# Patient Record
Sex: MALE | Race: WHITE | NOT HISPANIC OR LATINO | Employment: FULL TIME | URBAN - METROPOLITAN AREA
[De-identification: names, ages, dates, MRNs, and addresses within clinical notes are randomized per-mention and may not be internally consistent; named-entity substitution may affect disease eponyms.]

---

## 2020-07-04 ENCOUNTER — HOSPITAL ENCOUNTER (EMERGENCY)
Facility: HOSPITAL | Age: 30
Discharge: HOME | End: 2020-07-04
Attending: EMERGENCY MEDICINE
Payer: OTHER GOVERNMENT

## 2020-07-04 VITALS
WEIGHT: 240 LBS | TEMPERATURE: 99 F | HEART RATE: 106 BPM | DIASTOLIC BLOOD PRESSURE: 82 MMHG | RESPIRATION RATE: 18 BRPM | BODY MASS INDEX: 33.6 KG/M2 | SYSTOLIC BLOOD PRESSURE: 135 MMHG | OXYGEN SATURATION: 98 % | HEIGHT: 71 IN

## 2020-07-04 DIAGNOSIS — J02.9 SORE THROAT: Primary | ICD-10-CM

## 2020-07-04 PROCEDURE — 99282 EMERGENCY DEPT VISIT SF MDM: CPT

## 2020-07-04 PROCEDURE — U0003 INFECTIOUS AGENT DETECTION BY NUCLEIC ACID (DNA OR RNA); SEVERE ACUTE RESPIRATORY SYNDROME CORONAVIRUS 2 (SARS-COV-2) (CORONAVIRUS DISEASE [COVID-19]), AMPLIFIED PROBE TECHNIQUE, MAKING USE OF HIGH THROUGHPUT TECHNOLOGIES AS DESCRIBED BY CMS-2020-01-R: HCPCS

## 2020-07-04 NOTE — ED PROVIDER NOTES
Encounter Date: 7/4/2020    SCRIBE #1 NOTE: I, Claude Washington, am scribing for, and in the presence of, Dr. Martínez.       History     Chief Complaint   Patient presents with    Sore Throat     Time seen by provider: 3:40 PM on 07/04/2020      Raoul Burgos is a 30 y.o. male with no pertinent PMHx who presents to the ED for a sore throat that started 1 day ago. 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 He states that his sore throat is his only symptom. Patient denies fever, body aches, chest pain, SOB, chills, abdominal pain, nausea, vomiting, diarrhea, or any other complaint at this time. The patient has no pertinent PSHx.    The history is provided by the patient.     Review of patient's allergies indicates:  No Known Allergies  History reviewed. No pertinent past medical history.  No past surgical history on file.  No family history on file.  Social History     Tobacco Use    Smoking status: Not on file   Substance Use Topics    Alcohol use: Not on file    Drug use: Not on file     Review of Systems   Constitutional: Negative for chills and fever.   HENT: Positive for sore throat. Negative for congestion.    Eyes: Negative for pain.   Respiratory: Negative for cough and shortness of breath.    Cardiovascular: Negative for chest pain.   Gastrointestinal: Negative for abdominal pain, diarrhea, nausea and vomiting.   Genitourinary: Negative for dysuria.   Musculoskeletal: Negative for myalgias.   Skin: Negative for color change.   Neurological: Negative for headaches.   Hematological: Does not bruise/bleed easily.       Physical Exam     Initial Vitals [07/04/20 1445]   BP Pulse Resp Temp SpO2   135/82 106 18 99 °F (37.2 °C) 98 %      MAP       --         Physical Exam    Nursing note and vitals reviewed.  Constitutional: He appears well-developed and well-nourished. He is not diaphoretic. No distress.   HENT:   Head: Normocephalic and atraumatic.   Eyes:  EOM are normal. Pupils are equal, round, and reactive to light.   Neck: Normal range of motion. Neck supple.   Cardiovascular: Normal rate, regular rhythm, normal heart sounds and intact distal pulses. Exam reveals no gallop and no friction rub.    No murmur heard.  Pulmonary/Chest: Breath sounds normal. No respiratory distress. He has no wheezes. He has no rhonchi. He has no rales.   Abdominal: Soft. Bowel sounds are normal. There is no abdominal tenderness. There is no rebound and no guarding.   Musculoskeletal: Normal range of motion.   Neurological: He is alert and oriented to person, place, and time.   Skin: Skin is warm.   Psychiatric: He has a normal mood and affect. His behavior is normal. Judgment and thought content normal.         ED Course   Procedures  Labs Reviewed   SARS-COV-2 (COVID-19) QUALITATIVE PCR          Imaging Results    None          Medical Decision Making:   History:   Old Medical Records: I decided to obtain old medical records.  Initial Assessment:   30-year-old male presented with a sore throat.  Differential Diagnosis:   Initial differential diagnosis included but not limited to pharyngitis, viral illness, and COVID.  Clinical Tests:   Lab Tests: Ordered and Reviewed  ED Management:  The patient was urgently evaluated in the emergency department, his evaluation was significant for a young male with a benign exam.  The patient states he is here for COVID testing.  We did send a routine COVID test from the emergency department.  He is stable for discharge to home.  There is no evidence of a bacterial pharyngitis noted at present.  He has been instructed to self isolated home until he knows his test results.  He is to otherwise  follow-up with primary care.  He is to take over-the-counter medications as needed for symptomatic relief.              Scribe Attestation:   Scribe #1: I performed the above scribed service and the documentation accurately describes the services I performed. I  attest to the accuracy of the note.               I, Dr. Vishnu Martínez, personally performed the services described in this documentation. All medical record entries made by the scribe were at my direction and in my presence.  I have reviewed the chart and agree that the record reflects my personal performance and is accurate and complete. Vishnu Martínez MD.  5:21 PM 07/04/2020             Clinical Impression:       ICD-10-CM ICD-9-CM   1. Sore throat  J02.9 462         Disposition:   Disposition: Discharged  Condition: Stable     ED Disposition Condition    Discharge Stable        ED Prescriptions     None        Follow-up Information     Follow up With Specialties Details Why Contact [address] [address]  [phone]                                       Vishnu Martínez MD  07/04/20 8084

## 2020-07-05 LAB — SARS-COV-2 RNA RESP QL NAA+PROBE: NOT DETECTED

## 2021-05-12 ENCOUNTER — PATIENT MESSAGE (OUTPATIENT)
Dept: RESEARCH | Facility: HOSPITAL | Age: 31
End: 2021-05-12

## 2021-07-01 ENCOUNTER — PATIENT MESSAGE (OUTPATIENT)
Dept: ADMINISTRATIVE | Facility: OTHER | Age: 31
End: 2021-07-01

## 2024-02-26 ENCOUNTER — OFFICE VISIT (OUTPATIENT)
Dept: FAMILY MEDICINE | Facility: CLINIC | Age: 34
End: 2024-02-26

## 2024-02-26 VITALS
HEART RATE: 100 BPM | RESPIRATION RATE: 18 BRPM | SYSTOLIC BLOOD PRESSURE: 130 MMHG | HEIGHT: 71 IN | TEMPERATURE: 99 F | OXYGEN SATURATION: 96 % | BODY MASS INDEX: 35.28 KG/M2 | WEIGHT: 252 LBS | DIASTOLIC BLOOD PRESSURE: 80 MMHG

## 2024-02-26 DIAGNOSIS — Z00.00 PREVENTATIVE HEALTH CARE: ICD-10-CM

## 2024-02-26 DIAGNOSIS — F41.9 ANXIETY: Primary | ICD-10-CM

## 2024-02-26 DIAGNOSIS — R05.3 CHRONIC COUGH: ICD-10-CM

## 2024-02-26 DIAGNOSIS — F17.200 SMOKING: ICD-10-CM

## 2024-02-26 PROCEDURE — 99213 OFFICE O/P EST LOW 20 MIN: CPT | Mod: PBBFAC,PN | Performed by: FAMILY MEDICINE

## 2024-02-26 PROCEDURE — 99999 PR PBB SHADOW E&M-EST. PATIENT-LVL III: CPT | Mod: PBBFAC,,, | Performed by: FAMILY MEDICINE

## 2024-02-26 PROCEDURE — 99204 OFFICE O/P NEW MOD 45 MIN: CPT | Mod: S$PBB,,, | Performed by: FAMILY MEDICINE

## 2024-02-26 NOTE — PROGRESS NOTES
"Subjective:       Patient ID: Raoul Burgos is a 34 y.o. male.    Chief Complaint: Establish Care, Anxiety, Depression, and ocd      Is here to get established.  He does have a history of depression anxiety possible childhood ADD and OCD type behaviors.  There is no problem list on file for this patient.   Smokes 1ppd.                        Anxiety  Presents for initial visit. Onset was more than 5 years ago. The problem has been resolved. Symptoms include excessive worry, nervous/anxious behavior and obsessions. Patient reports no chest pain, compulsions, confusion, decreased concentration or feeling of choking.       DepressionPatient presents with the following symptoms: excessive worry, nervousness/anxiety and obsessions.  Patient is not experiencing: compulsions, confusion and decreased concentration.          Allergies and Medications:   Review of patient's allergies indicates:  No Known Allergies  No current outpatient medications on file.     No current facility-administered medications for this visit.       Family History:   Family History   Problem Relation Age of Onset    Cancer Father         possible lung    Cancer Other         lung       Social History:   Social History     Socioeconomic History    Marital status: Single   Tobacco Use    Smoking status: Every Day     Types: Cigarettes    Smokeless tobacco: Never   Substance and Sexual Activity    Alcohol use: Yes     Comment: occ    Drug use: Not Currently     Types: "Crack" cocaine, Methamphetamines, MDMA (Ecstacy)    Sexual activity: Not Currently     Partners: Female       Review of Systems   Cardiovascular:  Negative for chest pain.   Psychiatric/Behavioral:  Positive for depression. Negative for confusion and decreased concentration. The patient is nervous/anxious.        Objective:     Vitals:    02/26/24 1401   BP: 130/80   Pulse: 100   Resp: 18   Temp: 98.6 °F (37 °C)        Physical Exam  Vitals and nursing note reviewed. "   Constitutional:       Appearance: He is well-developed. He is not diaphoretic.   HENT:      Head: Normocephalic.   Eyes:      Conjunctiva/sclera: Conjunctivae normal.      Pupils: Pupils are equal, round, and reactive to light.   Cardiovascular:      Rate and Rhythm: Normal rate and regular rhythm.      Heart sounds: Normal heart sounds. No murmur heard.     No friction rub. No gallop.   Pulmonary:      Effort: Pulmonary effort is normal. No respiratory distress.      Breath sounds: Normal breath sounds. No stridor. No wheezing, rhonchi or rales.   Chest:      Chest wall: No tenderness.   Musculoskeletal:      Right lower leg: No edema.      Left lower leg: No edema.   Skin:     General: Skin is warm and dry.   Psychiatric:         Behavior: Behavior normal.         Thought Content: Thought content normal.         Judgment: Judgment normal.         Assessment:       1. Anxiety    2. Preventative health care    3. Chronic cough    4. Smoking        Plan:       Raoul was seen today for establish care, anxiety, depression and ocd.    Diagnoses and all orders for this visit:    Anxiety  -     Ambulatory referral/consult to Psychology; Future    Preventative health care  -     HEPATITIS C ANTIBODY; Future  -     LIPID PANEL; Future  -     HIV 1/2 Ag/Ab (4th Gen); Future  -     Comprehensive Metabolic Panel; Future    Chronic cough  -     X-Ray Chest PA And Lateral; Future    Smoking         Follow up in about 6 months (around 8/26/2024) for annual.

## 2024-03-27 ENCOUNTER — HOSPITAL ENCOUNTER (OUTPATIENT)
Dept: RADIOLOGY | Facility: HOSPITAL | Age: 34
Discharge: HOME | End: 2024-03-27
Attending: FAMILY MEDICINE

## 2024-03-27 DIAGNOSIS — R05.3 CHRONIC COUGH: ICD-10-CM

## 2024-03-27 PROCEDURE — 71046 X-RAY EXAM CHEST 2 VIEWS: CPT | Mod: TC,PO

## 2024-03-28 ENCOUNTER — PATIENT MESSAGE (OUTPATIENT)
Dept: FAMILY MEDICINE | Facility: CLINIC | Age: 34
End: 2024-03-28

## 2024-03-28 ENCOUNTER — TELEPHONE (OUTPATIENT)
Dept: FAMILY MEDICINE | Facility: CLINIC | Age: 34
End: 2024-03-28

## 2024-03-28 ENCOUNTER — TELEPHONE (OUTPATIENT)
Dept: PSYCHIATRY | Facility: CLINIC | Age: 34
End: 2024-03-28

## 2024-03-28 DIAGNOSIS — R74.01 TRANSAMINASEMIA: Primary | ICD-10-CM

## 2024-03-28 NOTE — TELEPHONE ENCOUNTER
Patient message      So there isn't any concerns for my alt levels behing high or my triglycerides levels being really high. Just want make sure  thank you

## 2024-03-28 NOTE — TELEPHONE ENCOUNTER
----- Message from Norm Bright MD sent at 3/28/2024  8:17 AM CDT -----  Results Ok, notify patient.

## 2024-03-28 NOTE — TELEPHONE ENCOUNTER
Spoke to patient regarding therapy referral from Dr. Bright. Advised patient of the wait list, patient would like to be placed on the wait list. Offered resources, patient accepted, sent via my chart.

## 2024-03-29 ENCOUNTER — PATIENT MESSAGE (OUTPATIENT)
Dept: FAMILY MEDICINE | Facility: CLINIC | Age: 34
End: 2024-03-29

## 2024-05-27 PROBLEM — Z00.00 PREVENTATIVE HEALTH CARE: Status: RESOLVED | Noted: 2024-02-26 | Resolved: 2024-05-27

## 2024-12-26 ENCOUNTER — OFFICE VISIT (OUTPATIENT)
Dept: FAMILY MEDICINE | Facility: CLINIC | Age: 34
End: 2024-12-26

## 2024-12-26 VITALS
SYSTOLIC BLOOD PRESSURE: 128 MMHG | HEIGHT: 71 IN | TEMPERATURE: 99 F | DIASTOLIC BLOOD PRESSURE: 84 MMHG | RESPIRATION RATE: 18 BRPM | HEART RATE: 96 BPM | OXYGEN SATURATION: 99 % | BODY MASS INDEX: 35.84 KG/M2 | WEIGHT: 256 LBS

## 2024-12-26 DIAGNOSIS — F17.200 SMOKING: ICD-10-CM

## 2024-12-26 DIAGNOSIS — F41.9 ANXIETY: ICD-10-CM

## 2024-12-26 DIAGNOSIS — R74.01 TRANSAMINASEMIA: ICD-10-CM

## 2024-12-26 DIAGNOSIS — E78.2 MIXED HYPERLIPIDEMIA: ICD-10-CM

## 2024-12-26 DIAGNOSIS — Z00.00 PREVENTATIVE HEALTH CARE: Primary | ICD-10-CM

## 2024-12-26 PROCEDURE — 99999 PR PBB SHADOW E&M-EST. PATIENT-LVL IV: CPT | Mod: PBBFAC,,, | Performed by: FAMILY MEDICINE

## 2024-12-26 PROCEDURE — 99395 PREV VISIT EST AGE 18-39: CPT | Mod: S$PBB,,, | Performed by: FAMILY MEDICINE

## 2024-12-26 PROCEDURE — 99214 OFFICE O/P EST MOD 30 MIN: CPT | Mod: PBBFAC,PN | Performed by: FAMILY MEDICINE

## 2024-12-26 RX ORDER — MUPIROCIN 20 MG/G
OINTMENT TOPICAL 3 TIMES DAILY
Qty: 30 G | Refills: 4 | Status: SHIPPED | OUTPATIENT
Start: 2024-12-26

## 2024-12-26 RX ORDER — PRAVASTATIN SODIUM 10 MG/1
10 TABLET ORAL DAILY
Qty: 90 TABLET | Refills: 3 | Status: SHIPPED | OUTPATIENT
Start: 2024-12-26 | End: 2025-12-26

## 2024-12-26 NOTE — PROGRESS NOTES
"Subjective:       Patient ID: Raoul Burgos is a 34 y.o. male.    Chief Complaint: Annual Exam      HPI    Allergies and Medications:   Review of patient's allergies indicates:  No Known Allergies  Current Outpatient Medications   Medication Sig Dispense Refill    mupirocin (BACTROBAN) 2 % ointment Apply topically 3 (three) times daily. 30 g 4    pravastatin (PRAVACHOL) 10 MG tablet Take 1 tablet (10 mg total) by mouth once daily. 90 tablet 3     No current facility-administered medications for this visit.       Family History:   Family History   Problem Relation Name Age of Onset    Cancer Father          possible lung    Cancer Other uncle         lung       Social History:   Social History     Socioeconomic History    Marital status: Single   Tobacco Use    Smoking status: Every Day     Types: Cigarettes    Smokeless tobacco: Never   Substance and Sexual Activity    Alcohol use: Yes     Comment: occ    Drug use: Not Currently     Types: "Crack" cocaine, Methamphetamines, MDMA (Ecstacy)    Sexual activity: Not Currently     Partners: Female       Review of Systems   Constitutional:  Positive for fatigue and unexpected weight change (8 lb). Negative for activity change, appetite change, chills, diaphoresis and fever.   HENT:  Negative for congestion, dental problem, drooling, ear discharge, ear pain, facial swelling, hearing loss, mouth sores, nosebleeds, postnasal drip, rhinorrhea, sinus pressure, sinus pain, sneezing, sore throat, tinnitus, trouble swallowing and voice change.    Eyes:  Negative for photophobia, pain, discharge, redness, itching and visual disturbance.   Respiratory:  Positive for cough (smoking). Negative for apnea (Not have a sleep partner and does not know if he snores or has apnea), choking, chest tightness, shortness of breath, wheezing and stridor.    Cardiovascular:  Negative for chest pain, palpitations and leg swelling.   Gastrointestinal:  Negative for abdominal distention, " abdominal pain, anal bleeding, blood in stool, constipation, diarrhea, nausea, rectal pain and vomiting.   Endocrine: Negative for cold intolerance, heat intolerance, polydipsia, polyphagia and polyuria.   Genitourinary:  Negative for decreased urine volume, difficulty urinating, dysuria, enuresis, flank pain, frequency, genital sores, hematuria, penile discharge, penile pain, penile swelling, scrotal swelling, testicular pain and urgency.   Musculoskeletal:  Positive for back pain (low back pain from a fall in the distant past.). Negative for arthralgias, gait problem, joint swelling, myalgias, neck pain and neck stiffness.   Skin:  Negative for color change, pallor, rash and wound.        History of MRSA or staph infection.   Allergic/Immunologic: Negative for environmental allergies, food allergies and immunocompromised state.   Neurological:  Negative for dizziness, tremors, seizures, syncope, facial asymmetry, speech difficulty, weakness, light-headedness, numbness and headaches.   Hematological:  Negative for adenopathy. Does not bruise/bleed easily.   Psychiatric/Behavioral:  Negative for agitation, behavioral problems, confusion, decreased concentration, dysphoric mood, hallucinations, self-injury, sleep disturbance and suicidal ideas. The patient is not nervous/anxious and is not hyperactive.        Objective:     Vitals:    12/26/24 1334   BP: 128/84   Pulse: 96   Resp: 18   Temp: 98.6 °F (37 °C)        Physical Exam    Assessment:       1. Preventative health care    2. Transaminasemia    3. Mixed hyperlipidemia    4. Anxiety    5. Smoking        Plan:       Raoul was seen today for annual exam.    Diagnoses and all orders for this visit:    Preventative health care  -     pravastatin (PRAVACHOL) 10 MG tablet; Take 1 tablet (10 mg total) by mouth once daily.  -     CBC Auto Differential; Future  -     TESTOSTERONE PANEL; Future  -     mupirocin (BACTROBAN) 2 % ointment; Apply topically 3 (three) times  daily.    Transaminasemia  -     Comprehensive Metabolic Panel; Future    Mixed hyperlipidemia  -     Lipid Panel; Future  -     Comprehensive Metabolic Panel; Future    Anxiety  -     Ambulatory referral/consult to Psychology; Future    Smoking         Follow up in about 6 months (around 6/26/2025).Subjective:       Patient ID: Raoul Burgos is a 34 y.o. male.    Chief Complaint: Annual Exam    Lab Results   Component Value Date    CHOL 215 (H) 03/27/2024    TRIG 273 (H) 03/27/2024    HDL 51 03/27/2024    ALT 60 (H) 03/27/2024    AST 31 03/27/2024     03/27/2024    K 3.8 03/27/2024     03/27/2024    CREATININE 1.0 03/27/2024    BUN 17 03/27/2024    CO2 26 03/27/2024     Lab Results   Component Value Date    CHOL 215 (H) 03/27/2024     Lab Results   Component Value Date    HDL 51 03/27/2024     Lab Results   Component Value Date    LDLCALC 109.4 03/27/2024     Lab Results   Component Value Date    TRIG 273 (H) 03/27/2024       Lab Results   Component Value Date    CHOLHDL 23.7 03/27/2024           History of Present Illness    CHIEF COMPLAINT:  Mr. Burgos presents for an annual wellness visit and to discuss recent lab results.    HPI:  Mr. Burgos reports ongoing anxiety, which has remained stable since his last visit. A referral was made to a psychiatric doctor, but he has not been contacted. The paperwork indicated an appointment could take up to a year.    He expresses concern about recent labs results. His cholesterol was elevated at 215, with LDL at 109 and triglycerides at 273. His glucose level was 111, in the pre-diabetic range.    Mr. Burgos has gained approximately 8 lbs since his last visit, with current weight at 256 lbs, up from 240 lbs previously.    He has intermittent lower back pain from a fall that occurred approximately 10 years ago. The pain occurs primarily when sitting in certain positions and does not affect him when standing.    He has a history of MRSA staph  infection. New lesions develop every couple of months and resolve spontaneously. He presented a recent lesion that had become erythematous and enlarged.    Mr. Burgos reports fatigue and inquires about testosterone level testing.    He sometimes has difficulty sleeping, which he attributes to previously working late nights at a bar. He is uncertain if he snores.    Mr. Burgos denies any new medical problems, fever, chills, sinus congestion, dental problems, ear infections, hearing loss, tinnitus, sore throat, hoarseness, change in voice, dysphagia, vision disturbance, photosensitivity, eye pain, redness, itching or burning, wheezing, leg swelling, palpitations, chest pain, chest tightness, abdominal pain, distension, bloating, nausea, vomiting, constipation, hematochezia or melena, diarrhea, rectal pain or pruritus, cold intolerance, polydipsia, polyuria, hematuria or hematospermia, difficulty with urinary stream, testicular swelling or pain, joint pain (except lower back), stiffness in hands or feet, new moles on skin, environmental or food allergies, immune system weakness, dizziness, lightheadedness, numbness, seizures, syncope, tremors, weakness, lymphadenopathy, easy bruising, suicidal ideation, or peripheral edema.    Mr. Burgos denies having diagnosed sleep apnea or any known blood disorders.    MEDICAL HISTORY:  Mr. Burgos has a history of MRSA staph infection and lower back pain.    TEST RESULTS:  On March 27th, the patient underwent a cholesterol panel which revealed a total cholesterol of 215, LDL of 109, and triglycerides of 273. On the same day, his glucose level was measured at 111, falling within the pre-diabetic range.    SOCIAL HISTORY:  Mr. Burgos is a current smoker and has noted a dry cough. He works as a  and previously worked in construction. Mr. Burgos is single.      ROS:  Respiratory: +cough          Allergies and Medications:   Review of patient's allergies  "indicates:  No Known Allergies  Current Outpatient Medications   Medication Sig Dispense Refill    mupirocin (BACTROBAN) 2 % ointment Apply topically 3 (three) times daily. 30 g 4    pravastatin (PRAVACHOL) 10 MG tablet Take 1 tablet (10 mg total) by mouth once daily. 90 tablet 3     No current facility-administered medications for this visit.       Family History:   Family History   Problem Relation Name Age of Onset    Cancer Father          possible lung    Cancer Other uncle         lung       Social History:   Social History     Socioeconomic History    Marital status: Single   Tobacco Use    Smoking status: Every Day     Types: Cigarettes    Smokeless tobacco: Never   Substance and Sexual Activity    Alcohol use: Yes     Comment: occ    Drug use: Not Currently     Types: "Crack" cocaine, Methamphetamines, MDMA (Ecstacy)    Sexual activity: Not Currently     Partners: Female           Objective:     Vitals:    12/26/24 1334   BP: 128/84   Pulse: 96   Resp: 18   Temp: 98.6 °F (37 °C)        Physical Exam    General: No acute distress. Well-developed. Well-nourished.  Eyes: EOMI. Sclerae anicteric.  HENT: Normocephalic. Atraumatic. Nares patent. Moist oral mucosa.  Cardiovascular: Regular rate. Regular rhythm. No murmurs. No rubs. No gallops. Normal S1, S2. Capillary refill is adequate. PMI on the left meets the vascular line.  Respiratory: Normal respiratory effort. Clear to auscultation bilaterally. No rales. No rhonchi. No wheezing.  Abdomen: Soft. Non-tender. No organomegaly. Negative Collins's sign. Negative Collins's punch.  Musculoskeletal: No  obvious deformity.  Extremities: No lower extremity edema. No varicosities or other deformities.  Neurological: Alert & oriented x3. No slurred speech. Normal gait.  Psychiatric: Normal mood. Normal affect. Good insight. Good judgment.  Skin: Warm. Dry. No rash.            Assessment:       1. Preventative health care    2. Transaminasemia    3. Mixed hyperlipidemia  "   4. Anxiety    5. Smoking        Plan:       Assessment & Plan    IMPRESSION:  - Assessed patient's anxiety, considering medication initiation while awaiting psychiatric referral  - Evaluated elevated cholesterol levels and pre-diabetic glucose, determining need for statin therapy and lifestyle modifications  - Considered testosterone level check due to patient's fatigue complaints  - Assessed recurring MRSA-like skin lesions, deciding on topical treatment and lifestyle modifications    PRE-DIABETES / ABNORMAL GLUCOSE:  - Explained pre-diabetic range of glucose levels and importance of diet in reversing this trend.  - Discussed correlation between excess carbohydrate intake, cortisol production, and testosterone suppression.  - Ordered CBC, lipid panel, comprehensive metabolic panel, testosterone level, and A1C.    OBESITY AND WEIGHT MANAGEMENT:  - Educated on proper portion control using hand measurements and smaller plates for meal sizing.  - Discussed benefits of weight loss in improving energy levels and overall health.  - Mr. Burgos to use [employer] jeet to track daily calorie intake.  - Recommend using smaller plates with partitions for portion control.  - Recommend aiming for under 1800 calories per day for weight loss.    DIETARY COUNSELING:  - Recommend increasing consumption of fruits and vegetables.  - Recommend reducing intake of carbohydrates and starchy foods.  - Mr. Burgos to avoid foods with sugar listed in the first 3 ingredients.    HYPERLIPIDEMIA:  - Started pravastatin 10 mg daily for cholesterol management.    MRSA INFECTION:  - Started topical antibiotic ointment for MRSA-like lesions (30 g tube with refills).    SKIN CARE:  - Mr. Burgos to use Dove soap for bathing and showering.  - Mr. Burgos to wear sunscreen when working outside.    ANXIETY DISORDER:  - Modified psychiatry referral to [facility] for more accessible and affordable care.    FOLLOW-UP:  - Follow  up in 6 months to reassess cholesterol management.  - Contact the office sooner if anything is significantly abnormal in labs results.        Raoul was seen today for annual exam.    Diagnoses and all orders for this visit:    Preventative health care  -     pravastatin (PRAVACHOL) 10 MG tablet; Take 1 tablet (10 mg total) by mouth once daily.  -     CBC Auto Differential; Future  -     TESTOSTERONE PANEL; Future  -     mupirocin (BACTROBAN) 2 % ointment; Apply topically 3 (three) times daily.    Transaminasemia  -     Comprehensive Metabolic Panel; Future    Mixed hyperlipidemia  -     Lipid Panel; Future  -     Comprehensive Metabolic Panel; Future    Anxiety  -     Ambulatory referral/consult to Psychology; Future    Smoking         Follow up in about 6 months (around 6/26/2025).  This note was generated with the assistance of ambient listening technology. Verbal consent was obtained by the patient and accompanying visitor(s) for the recording of patient appointment to facilitate this note. I attest to having reviewed and edited the generated note for accuracy, though some syntax or spelling errors may persist. Please contact the author of this note for any clarification.

## 2024-12-26 NOTE — PATIENT INSTRUCTIONS
We understand that controlling diabetes can feel overwhelming at times. We are here to offer the tools and support to help you manage your diabetes and meet your health goals. Please reference the meal planning guide below.   [facility]  Diabetic Meal Planning    About this topic  Healthy eating is an important part of keeping your diabetes in control. A balanced diet along with your diabetic drugs will help you control your blood sugar. The right portions of healthy foods may help keep your sugar within your goal range. It may also help to lower or maintain your weight, manage cholesterol, the amount of fat in your blood, and blood pressure.      Image(s)    What will the results be?  Healthy eating may help you lower your blood sugar and keep it in a safe range. Keeping your blood sugar in a safe range may lower your chances for long term problems from your diabetes. You may be less likely to have damage to your kidneys, heart, eyes, or nerves.    What changes to diet are needed?  Healthy eating means:  Eating a range of foods from all food groups.  Eating the right size portions. Read the nutrition facts on each food you eat.  Eating meals and snacks at the same time each day. Do not skip a meal or snack. Skipping meals may cause your blood sugar to go too low, especially if you are on drugs for your diabetes. Skipping meals can also cause you to eat too much at the next meal.  Talk to your diabetes educator about making a personal meal plan for you. They can also help you eat the foods you like by modifying them to be healthier.    Who should use this diet?  This diet is helpful to people with high blood sugar or diabetes.    What foods are good to eat?  It is important to make a healthy meal. Eat a variety of different foods in the right portion.  Fill half of your plate with non-starchy vegetables. Non-starchy vegetables include: Broccoli, green beans, carrots, greens ([place], kale, mustard, turnip),  onions, tomatoes, asparagus, beets, cauliflower, celery, and cucumber. Non-starchy vegetables are high in fiber and low in carbohydrates. These will help keep you full for longer without raising your blood sugar.  Fill 1/4 of your plate with carbohydrates. Try to choose whole grain options. Whole-grain products have more fiber which will make you feel full. Carbohydrates include: Bread, rice, pasta, and starchy vegetables. Starchy vegetables include beans, potatoes, acorn squash, butternut squash, corn, and peas.  Fill 1/4 of your plate with protein. Choose low-fat cuts of meat like boneless, skinless chicken breast; pork loin; 90% lean beef; lean and skinless turkey meat; and fresh fish (not fried) such as tuna, salmon, or mackerel.  Add a low-fat or non-fat dairy product like 8 ounces (240 mL) of 1% or skim milk or 6 ounces (180 mL) of low-fat yogurt. Eat the recommended serving. Milk and yogurt have carbohydrates which raise your blood sugar.  Add a small piece of fruit or 1/2 cup (120 grams) of frozen or canned fruit. Choose canned fruits in juice or water. Fruits include apples, bananas, peaches, pears, pineapple, plums, and oranges. Eat the recommended serving. Fruit has carbohydrates which can raise your blood sugar.  Include healthy fats in your meal like olive oil, canola oil, avocado, and nuts.  Other good foods to include in your diet are:  Foods high in fiber. Adding fiber to your meals may help control your blood sugar and cholesterol levels. It may also help with weight loss and prevent belly problems. If you are younger than 50, it is recommended to get 25 to 38 grams per day. If you are older than 50, it is recommended to get 21 to 30 grams per day. Good sources of fiber include:  Nuts and seeds  Beans, peas, and other legumes  Whole-grain products  Fruits  Vegetables  Smart snacks in the right portion. Do not go too long in between meals. Ask your dietitian when you should have a snack in between  your meals. Snack on things like:  Nuts  Vegetables and low-fat dressing  Light popcorn  Low-fat cheese and crackers  1/2 sandwich    What foods should be limited or avoided?  You may need to limit the amount of some foods you eat and how often you eat them. Foods that should be limited include:  High fat or processed foods like:  [place]  Sausage  Hot dogs  Whole-fat dairy products  Potato chips  Foods high in sodium like:  Canned soups  Soy sauce and some salad dressings  Cured meats  Salted snack foods like pretzels  Olives  Fats and oils like:  Margarine  Salad dressing  Gravy  Lard or shortening  Foods high in sugar like:  Candy  Cookies  Cake  Ice cream  Table sugar  Soda  Juice drinks  Starches that are not whole grain like:  White rice  French fries  White pasta  White bread  Sugary cereals  Baked goods, pastries, croissants  Beer, wine, and mixed drinks (alcohol). Drink alcohol in moderation (1 drink per day or less for adult women and 2 drinks per day or less for adult men). Drinking alcohol can cause fluctuations in your blood sugar and interfere with how your diabetic drugs work. Talk to your doctor about how you can safely include alcohol into your diet.    What can be done to prevent this health problem?  Some people have a higher chance of developing diabetes than others. This is a life-long problem. You can still lead a normal life. Diabetes can be managed through diet, exercise, and drugs. It is important to have support from your family and friends to help you with your goals.    When do I need to call the doctor?  Blood sugar level is above 240 mg/dL for more than a day  Blood sugar level drops to less than 40 and does not respond to 15 grams of simple carbohydrate, like 4 glucose tablets or 1/2 cup (120 mL) of fruit juice  Trouble breathing  Very sleepy and trouble concentrating  Feeling very thirsty  Needing to urinate (pee) more than normal  Throw up more than once or have many loose stools  You  are so sick you cannot eat or drink  Fever over 101°F (38°C)  Questions about your diet plan  You are not feeling better in 2 to 3 days or you are feeling worse    Helpful tips  Plan ahead. Plan your meals and grocery list before going to the store. This will help you to choose foods that are good for you.  Pack a lunch. Take healthy meals and snacks with you to work.  Keep a food journal to help keep you on track. Take note of foods that keep your blood sugar in goal range. Also note foods and meals that raise or lower your blood sugar. There are apps for your phone that can help with this.  Visit a restaurant's website before eating out. You can see the menu options and nutritional facts. This way, you can see which items best fit into your diet plan. Call ahead if you have questions.    Disclaimer.This generalized information is a limited summary of diagnosis, treatment, and/or medication information. It is not meant to be comprehensive and should be used as a tool to help the user understand and/or assess potential diagnostic and treatment options. It does NOT include all information about conditions, treatments, medications, side effects, or risks that may apply to a specific patient. It is not intended to be medical advice or a substitute for the medical advice, diagnosis, or treatment of a health care provider based on the health care provider's examination and assessment of a patient's specific and unique circumstances. Patients must speak with a health care provider for complete information about their health, medical questions, and treatment options, including any risks or benefits regarding use of medications. This information does not endorse any treatments or medications as safe, effective, or approved for treating a specific patient. [employer] and its affiliates disclaim any warranty or liability relating to this information or the use thereof. The use of this information is governed by the Terms  of Use, available at Terms of Use. ©2022 [employer] and its affiliates and/or licensors. All rights reserved. Avoid anything with sugar in the 1st 3 ingredients including honey and syrup.  Avoid dried fruits like raise nodes and apricots.  Other fruits and vegetables are okay but the sugar content is higher in bananas and grapes.  Avoid large portions on your starchy foods:  Bread rice potatoes and pasta.